# Patient Record
Sex: FEMALE | Race: OTHER | HISPANIC OR LATINO | URBAN - METROPOLITAN AREA
[De-identification: names, ages, dates, MRNs, and addresses within clinical notes are randomized per-mention and may not be internally consistent; named-entity substitution may affect disease eponyms.]

---

## 2020-09-18 ENCOUNTER — OUTPATIENT (OUTPATIENT)
Dept: OUTPATIENT SERVICES | Facility: HOSPITAL | Age: 50
LOS: 1 days | End: 2020-09-18
Payer: COMMERCIAL

## 2020-09-18 DIAGNOSIS — I10 ESSENTIAL (PRIMARY) HYPERTENSION: ICD-10-CM

## 2020-09-18 DIAGNOSIS — R00.2 PALPITATIONS: ICD-10-CM

## 2020-09-18 DIAGNOSIS — R94.31 ABNORMAL ELECTROCARDIOGRAM [ECG] [EKG]: ICD-10-CM

## 2020-09-18 PROCEDURE — 93017 CV STRESS TEST TRACING ONLY: CPT

## 2020-09-19 ENCOUNTER — TRANSCRIPTION ENCOUNTER (OUTPATIENT)
Age: 50
End: 2020-09-19

## 2022-07-11 ENCOUNTER — TRANSCRIPTION ENCOUNTER (OUTPATIENT)
Age: 52
End: 2022-07-11

## 2022-07-11 NOTE — ASU PATIENT PROFILE, ADULT - FALL HARM RISK - UNIVERSAL INTERVENTIONS
Bed in lowest position, wheels locked, appropriate side rails in place/Call bell, personal items and telephone in reach/Instruct patient to call for assistance before getting out of bed or chair/Non-slip footwear when patient is out of bed/[place] to call system/Physically safe environment - no spills, clutter or unnecessary equipment/Purposeful Proactive Rounding/Room/bathroom lighting operational, light cord in reach

## 2022-07-11 NOTE — ASU PATIENT PROFILE, ADULT - NSICDXPASTMEDICALHX_GEN_ALL_CORE_FT
PAST MEDICAL HISTORY:  Acid reflux     Asthma     Hypertension     Psoriatic arthritis      PAST MEDICAL HISTORY:  Acid reflux     Asthma     Fibromyalgia     Hypertension     Psoriatic arthritis

## 2022-07-12 ENCOUNTER — TRANSCRIPTION ENCOUNTER (OUTPATIENT)
Age: 52
End: 2022-07-12

## 2022-07-12 ENCOUNTER — OUTPATIENT (OUTPATIENT)
Dept: OUTPATIENT SERVICES | Facility: HOSPITAL | Age: 52
LOS: 1 days | Discharge: HOME | End: 2022-07-12

## 2022-07-12 VITALS
RESPIRATION RATE: 16 BRPM | OXYGEN SATURATION: 99 % | DIASTOLIC BLOOD PRESSURE: 81 MMHG | TEMPERATURE: 100 F | WEIGHT: 186.29 LBS | SYSTOLIC BLOOD PRESSURE: 125 MMHG | HEART RATE: 81 BPM | HEIGHT: 67.5 IN

## 2022-07-12 VITALS
SYSTOLIC BLOOD PRESSURE: 135 MMHG | OXYGEN SATURATION: 98 % | HEART RATE: 80 BPM | DIASTOLIC BLOOD PRESSURE: 85 MMHG | TEMPERATURE: 98 F | RESPIRATION RATE: 16 BRPM

## 2022-07-12 DIAGNOSIS — Z98.890 OTHER SPECIFIED POSTPROCEDURAL STATES: Chronic | ICD-10-CM

## 2022-07-12 DIAGNOSIS — Z90.89 ACQUIRED ABSENCE OF OTHER ORGANS: Chronic | ICD-10-CM

## 2022-07-12 LAB — GLUCOSE BLDC GLUCOMTR-MCNC: 99 MG/DL — SIGNIFICANT CHANGE UP (ref 70–99)

## 2022-07-12 DEVICE — IMPLANTABLE DEVICE: Type: IMPLANTABLE DEVICE | Site: RIGHT | Status: FUNCTIONAL

## 2022-07-12 DEVICE — WIRE FIX REDUCTION 5MM: Type: IMPLANTABLE DEVICE | Site: RIGHT | Status: FUNCTIONAL

## 2022-07-12 DEVICE — K-WIRE MICROAIRE (SMOOTH) DOUBLE TROCAR 1.1MM X 4": Type: IMPLANTABLE DEVICE | Site: RIGHT | Status: FUNCTIONAL

## 2022-07-12 DEVICE — WIRE REDUCTION: Type: IMPLANTABLE DEVICE | Site: RIGHT | Status: FUNCTIONAL

## 2022-07-12 DEVICE — KWIRE 1.4X150MM: Type: IMPLANTABLE DEVICE | Site: RIGHT | Status: FUNCTIONAL

## 2022-07-12 DEVICE — KWIRE FIXATION PECA 1X150MM: Type: IMPLANTABLE DEVICE | Site: RIGHT | Status: FUNCTIONAL

## 2022-07-12 RX ORDER — FOLIC ACID 0.8 MG
1200 TABLET ORAL
Qty: 0 | Refills: 0 | DISCHARGE

## 2022-07-12 RX ORDER — METHOTREXATE 2.5 MG/1
20 TABLET ORAL
Qty: 0 | Refills: 0 | DISCHARGE

## 2022-07-12 RX ORDER — METFORMIN HYDROCHLORIDE 850 MG/1
1 TABLET ORAL
Qty: 0 | Refills: 0 | DISCHARGE

## 2022-07-12 RX ORDER — PREGABALIN 225 MG/1
2500 CAPSULE ORAL
Qty: 0 | Refills: 0 | DISCHARGE

## 2022-07-12 RX ORDER — OXYCODONE HYDROCHLORIDE 5 MG/1
5 TABLET ORAL ONCE
Refills: 0 | Status: DISCONTINUED | OUTPATIENT
Start: 2022-07-12 | End: 2022-07-12

## 2022-07-12 RX ORDER — MILNACIPRAN HYDROCHLORIDE 50 MG/1
0 TABLET, FILM COATED ORAL
Qty: 0 | Refills: 0 | DISCHARGE

## 2022-07-12 RX ORDER — IXEKIZUMAB 80 MG/ML
0 INJECTION, SOLUTION SUBCUTANEOUS
Qty: 0 | Refills: 0 | DISCHARGE

## 2022-07-12 RX ORDER — MELOXICAM 15 MG/1
1 TABLET ORAL
Qty: 0 | Refills: 0 | DISCHARGE

## 2022-07-12 RX ORDER — VALACYCLOVIR 500 MG/1
1 TABLET, FILM COATED ORAL
Qty: 0 | Refills: 0 | DISCHARGE

## 2022-07-12 RX ORDER — ACETAMINOPHEN 500 MG
650 TABLET ORAL ONCE
Refills: 0 | Status: DISCONTINUED | OUTPATIENT
Start: 2022-07-12 | End: 2022-07-12

## 2022-07-12 RX ORDER — HYDROCHLOROTHIAZIDE 25 MG
2.5 TABLET ORAL
Qty: 0 | Refills: 0 | DISCHARGE

## 2022-07-12 RX ORDER — LOSARTAN POTASSIUM 100 MG/1
1 TABLET, FILM COATED ORAL
Qty: 0 | Refills: 0 | DISCHARGE

## 2022-07-12 RX ORDER — SODIUM CHLORIDE 9 MG/ML
1000 INJECTION, SOLUTION INTRAVENOUS
Refills: 0 | Status: DISCONTINUED | OUTPATIENT
Start: 2022-07-12 | End: 2022-07-12

## 2022-07-12 RX ORDER — METOPROLOL TARTRATE 50 MG
0 TABLET ORAL
Qty: 0 | Refills: 0 | DISCHARGE

## 2022-07-12 NOTE — ASU DISCHARGE PLAN (ADULT/PEDIATRIC) - CARE PROVIDER_API CALL
Terry Russell ([place])  Podiatric Medicine and Surgery  [address], [address]  [place] [address]  Phone: [phone]  Fax: [phone]  Established Patient  Follow Up Time: 1 week

## 2022-07-12 NOTE — ASU DISCHARGE PLAN (ADULT/PEDIATRIC) - PROCEDURE
Correction of right foot bunion and Correction of right foot bunion and 2nd digit hammertoe deformity

## 2022-07-12 NOTE — ASU DISCHARGE PLAN (ADULT/PEDIATRIC) - ASU DC SPECIAL INSTRUCTIONSFT
You have multiple small incisions to your right foot, these small incisions are closed with stitches. Your stiches will be removed in podiatry clinic in 2-3 weeks. Do NOT remove the bandage on your right foot. Keep the right foot dressing clean, dry and intact; this dressing will be removed by the surgeon (Dr. Russell) during your first post-operative visit in 1 week.  If you are unable to make or wish to reschedule your first post-operative clinic appointment, please call the clinic as soon as possible.    At home, elevate your right lower leg to reduce swelling and pain. You have been prescribed Percocet 5/325mg, to be taken only as needed for management of severe pain. For mild-moderate pain, you may take over the counter ibuprofen (Motrin) or acetaminophen (Tylenol).    Only minimal walking is advised, place more weight on your right heel, use surgical shoe at all times when walking. Do NOT walk directly on your right foot and/or bandage. If you experience nausea, vomiting, chills, fever, pain out of proportion, increased redness/swelling, call the podiatry office and/or come to [facility].

## 2022-07-12 NOTE — BRIEF OPERATIVE NOTE - COMMENTS
No tourniquet used.   PreOp Local Nerve Block 25cc 0.5% Marcaine + 1% Lidocaine   PostOp 7cc Zynrelef No tourniquet used.   PreOp Local Nerve Block 20cc 0.5% Marcaine + 1% Lidocaine   IntraOp Local Nerve Block 5cc 1% Lidocaine   PostOp 7cc Zynrelef

## 2022-07-12 NOTE — BRIEF OPERATIVE NOTE - OPERATION/FINDINGS
Transverse osteotomy of 1st metatarsal of R foot, fixated with 54mm and 34mm screws. Medial closing edge osteotomy of proximal phalanx of great toe, fixated with 28mm screw. R 2nd digit hammertoe corrected via PIPJ arthroplasty, fixated with 0.45 K-wire. Dressing consists of adaptic, gauze, gonzalo, webrol, coban. Transverse osteotomy of 1st metatarsal of R foot, fixated with 54mm and 34mm screws. Medial closing edge osteotomy of proximal phalanx of great toe, fixated with 28mm screw. R 2nd digit hammertoe corrected via PIPJ arthroplasty, fixated with 0.45 K-wire. Surgical incisions closed primarily w/ 5-0 nylon. Dressing consists of adaptic, gauze, gonzalo, webrol, coban.

## 2022-07-12 NOTE — BRIEF OPERATIVE NOTE - NSICDXBRIEFPREOP_GEN_ALL_CORE_FT
PRE-OP DIAGNOSIS:  Bunion of right foot 12-Jul-2022 15:06:26  De Tena Rothman of right foot 12-Jul-2022 15:06:33  De Tena Rothman

## 2022-07-12 NOTE — ASU DISCHARGE PLAN (ADULT/PEDIATRIC) - WEIGHT BEARING INSTRUCTIONS
Weight bearing to right heel in surgical shoe at all times. Do NOT walk barefoot under any circumstances.

## 2022-07-12 NOTE — ASU DISCHARGE PLAN (ADULT/PEDIATRIC) - NS MD DC FALL RISK RISK
For information on Fall & Injury Prevention, visit: [email or web address] OR  [email or web address] OR  https://www.cdc.gov/steadi/patient.html

## 2022-07-12 NOTE — ASU DISCHARGE PLAN (ADULT/PEDIATRIC) - PAIN MANAGEMENT
Percocet 5/325mg PO, 1-2 tablets every 4-6 hours for post-operative right foot pain./Prescriptions electronically submitted to pharmacy from [facility]

## 2022-07-12 NOTE — BRIEF OPERATIVE NOTE - NSICDXBRIEFPROCEDURE_GEN_ALL_CORE_FT
PROCEDURES:  Osteotomy of 1st metatarsal 12-Jul-2022 15:05:48  De Tena Rothman  Osteotomy, great toe, proximal phalanx 12-Jul-2022 15:06:01  De Tena Rothman  Arthroplasty, PIP joint, toe 12-Jul-2022 15:06:15  De Tena Rothman

## 2022-07-12 NOTE — BRIEF OPERATIVE NOTE - NSICDXBRIEFPOSTOP_GEN_ALL_CORE_FT
POST-OP DIAGNOSIS:  Bunion of right foot 12-Jul-2022 15:06:42  De Tena Rothmanertoe of second toe of right foot 12-Jul-2022 15:06:53  De Tena Rothman

## 2025-07-24 PROBLEM — I10 ESSENTIAL (PRIMARY) HYPERTENSION: Chronic | Status: ACTIVE | Noted: 2022-07-11

## 2025-07-24 PROBLEM — J45.909 UNSPECIFIED ASTHMA, UNCOMPLICATED: Chronic | Status: ACTIVE | Noted: 2022-07-11

## 2025-07-24 PROBLEM — L40.50 ARTHROPATHIC PSORIASIS, UNSPECIFIED: Chronic | Status: ACTIVE | Noted: 2022-07-11

## 2025-07-24 PROBLEM — K21.9 GASTRO-ESOPHAGEAL REFLUX DISEASE WITHOUT ESOPHAGITIS: Chronic | Status: ACTIVE | Noted: 2022-07-11

## 2025-07-24 PROBLEM — M79.7 FIBROMYALGIA: Chronic | Status: ACTIVE | Noted: 2022-07-12

## 2025-07-31 ENCOUNTER — OUTPATIENT (OUTPATIENT)
Dept: OUTPATIENT SERVICES | Facility: HOSPITAL | Age: 55
LOS: 1 days | End: 2025-07-31
Payer: SELF-PAY

## 2025-07-31 ENCOUNTER — APPOINTMENT (OUTPATIENT)
Dept: CT IMAGING | Facility: HOSPITAL | Age: 55
End: 2025-07-31

## 2025-07-31 DIAGNOSIS — Z98.890 OTHER SPECIFIED POSTPROCEDURAL STATES: Chronic | ICD-10-CM

## 2025-07-31 DIAGNOSIS — Z90.89 ACQUIRED ABSENCE OF OTHER ORGANS: Chronic | ICD-10-CM

## 2025-07-31 PROCEDURE — 75571 CT HRT W/O DYE W/CA TEST: CPT | Mod: 26

## (undated) DEVICE — GLV 7.5 PROTEXIS (WHITE)

## (undated) DEVICE — BUR NOVASTEP WEDGE CUT OSTEOTOMY BUNION 3.1MM

## (undated) DEVICE — DRSG STOCKINETTE UNDERCAST SYNTHETIC 4"

## (undated) DEVICE — DRAPE C ARM MINI PACK FOR 6800

## (undated) DEVICE — DRSG ADAPTIC 3 X 8"

## (undated) DEVICE — SLV COMPRESSION KNEE MED

## (undated) DEVICE — BUR BRASSELER OVAL 4 X 8MM

## (undated) DEVICE — SUT MONOCRYL 5-0 18" PS-2

## (undated) DEVICE — POSITIONER FOAM EGG CRATE ULNAR 2PCS (PINK)

## (undated) DEVICE — PACK ORTHO FOOT ANKLE

## (undated) DEVICE — DRSG KLING 4"

## (undated) DEVICE — SAW BLADE LINVATEC SAGITTAL MIC 9.5X25.5X0.4MM

## (undated) DEVICE — DRILL 3.2MM

## (undated) DEVICE — DRSG COBAN 3" LF NONSTERILE

## (undated) DEVICE — WARMING BLANKET UPPER ADULT

## (undated) DEVICE — BUR NOVASTEP SURGICAL 2 X 8MM

## (undated) DEVICE — TOURNIQUET CUFF 18" DUAL PORT SINGLE BLADDER W PLC  (BLACK)

## (undated) DEVICE — DRILL 2MM

## (undated) DEVICE — DRSG ACE BANDAGE 4"

## (undated) DEVICE — BUR NOVASTEP CUT AKIN OSTEOTMY 2X12MM

## (undated) DEVICE — SUT NYLON 5-0 18" R-5

## (undated) DEVICE — MARKING PEN W RULER

## (undated) DEVICE — BUR NOVASTEP CUT MIS BUNION OSTEOTOMY 2.2MM

## (undated) DEVICE — SUT VICRYL 4-0 18" P-2 UNDYED

## (undated) DEVICE — SUT VICRYL 3-0 18" PS-2 UNDYED